# Patient Record
(demographics unavailable — no encounter records)

---

## 2019-12-20 NOTE — NUR
FIRST CONTACT WITH PT. PT STATES "I HAD A KIDNEY STONE AND THEY DID SURGERY 
LAST WEEK BUT I'M STILL HAVING NAUSEA, VOMITING AND PAIN". [facility] SE[facility] YESTER[facility] FOR UTI. STILL HAVING BILATERAL FLANK PAIN THAT IS GETTING 
WORSE. PT'S AOX4. RESPS EVEN AND UNLABORED. BP/SPO2 MONITORS IN PLACE. CALL 
LIGHT WITHIN REACH. EDMD AT BEDSIDE TO EVALUATE AT THIS TIME.

## 2019-12-21 NOTE — NUR
PT RESTING IN [facility]. RESPS EVEN AND UNLABORED. BP/SPO2 MONITORS IN PLACE. CALL 
LIGHT WITHIN REACH.

## 2019-12-21 NOTE — NUR
ASSUME CARE OF PT.  PT TO BE ADMITTED.  NO BLOOD CULTURES PRIOR TO ABX PER MD.  
IV STARTED, BOLUS STARTED, ABX NOW INFUSING.